# Patient Record
Sex: FEMALE | Race: BLACK OR AFRICAN AMERICAN | NOT HISPANIC OR LATINO | Employment: OTHER | URBAN - METROPOLITAN AREA
[De-identification: names, ages, dates, MRNs, and addresses within clinical notes are randomized per-mention and may not be internally consistent; named-entity substitution may affect disease eponyms.]

---

## 2023-03-11 ENCOUNTER — HOSPITAL ENCOUNTER (EMERGENCY)
Facility: HOSPITAL | Age: 83
Discharge: HOME | End: 2023-03-11
Attending: FAMILY MEDICINE
Payer: MEDICARE

## 2023-03-11 VITALS
TEMPERATURE: 98 F | RESPIRATION RATE: 16 BRPM | HEART RATE: 65 BPM | WEIGHT: 158.31 LBS | OXYGEN SATURATION: 100 % | SYSTOLIC BLOOD PRESSURE: 142 MMHG | DIASTOLIC BLOOD PRESSURE: 64 MMHG
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
## 2023-03-11 PROCEDURE — 99284 EMERGENCY DEPT VISIT MOD MDM: CPT | Mod: 25

## 2023-03-12 NOTE — ED PROVIDER NOTES
SCRIBE #1 NOTE: I, Aida Law, am scribing for, and in the presence of, Yudi Campbell MD. I have scribed the entire note.       History     Chief Complaint   Patient presents with    Fall     Sent from the nursing home after a fall today for evaluation. No LOC, takes ASA daily, Lac. Noted left eye with no bleeding. Pt. Is nonverbal, Hx. Of dementia.      Review of patient's allergies indicates:  No Known Allergies      History of Present Illness     HPI    3/11/2023, 7:13 PM  History obtained from the patient      History of Present Illness: Ansley Grant is a 82 y.o. female patient who presents to the Emergency Department for evaluation of a fall which onset gradually PTA. The Pt was sent from her nursing home and sustained a laceration on the L side of her forehead. Symptoms are constant and moderate in severity. No mitigating or exacerbating factors reported. Associated sxs include wound on R foot. Patient denies any fever, chills, CP, SOB, n/v/d, weakness, LOC, and all other sxs at this time. No prior Tx reported .No further complaints or concerns at this time.       Arrival mode: EMS    PCP: No primary care provider on file.        Past Medical History:  No past medical history on file.    Past Surgical History:  No past surgical history on file.      Family History:  No family history on file.    Social History:  Social History     Tobacco Use    Smoking status: Not on file    Smokeless tobacco: Not on file   Substance and Sexual Activity    Alcohol use: Not on file    Drug use: Not on file    Sexual activity: Not on file        Review of Systems     Review of Systems   Constitutional:  Negative for chills and fever.   HENT:  Negative for sore throat.         (+) laceration on forehead   Respiratory:  Negative for shortness of breath.    Cardiovascular:  Negative for chest pain.   Gastrointestinal:  Negative for diarrhea, nausea and vomiting.   Genitourinary:  Negative for dysuria.    Musculoskeletal:  Negative for back pain.   Skin:  Positive for wound (R foot). Negative for rash.   Neurological:  Negative for weakness.        (-) LOC   Hematological:  Does not bruise/bleed easily.   All other systems reviewed and are negative.     Physical Exam     Initial Vitals   BP Pulse Resp Temp SpO2   03/11/23 1728 03/11/23 1728 03/11/23 1728 03/11/23 1815 03/11/23 1728   (!) 151/60 94 20 98.2 °F (36.8 °C) 95 %      MAP       --                 Physical Exam  Nursing Notes and Vital Signs Reviewed.  Constitutional: Patient is in no apparent distress. Well-developed and well-nourished.  Head: Atraumatic. Normocephalic. 1 cm laceration on the L side of her forehead with no bleeding.   Eyes: PERRL. EOM intact. Conjunctivae are not pale. No scleral icterus.  ENT: Mucous membranes are moist. Oropharynx is clear and symmetric.    Neck: Supple. Full ROM. No lymphadenopathy.  Cardiovascular: Regular rate. Regular rhythm. No murmurs, rubs, or gallops. Distal pulses are 2+ and symmetric.  Pulmonary/Chest: No respiratory distress. Clear to auscultation bilaterally. No wheezing or rales.  Abdominal: Soft and non-distended.  There is no tenderness.  No rebound, guarding, or rigidity. Good bowel sounds.  Genitourinary: No CVA tenderness  Musculoskeletal: Moves all extremities. No obvious deformities. No edema. No calf tenderness. On the R foot under first MTP joint, there is a dry, shallow diabetic ulcer.   Skin: Warm and dry.  Neurological:  Alert, awake, and appropriate.  Normal speech.  No acute focal neurological deficits are appreciated.  Psychiatric: Normal affect. Good eye contact. Appropriate in content.     ED Course   Procedures  ED Vital Signs:  Vitals:    03/11/23 1728 03/11/23 1745 03/11/23 1815 03/11/23 1925   BP: (!) 151/60 (!) 145/67  (!) 142/64   Pulse: 94 67  65   Resp: 20 18  16   Temp:   98.2 °F (36.8 °C)    TempSrc:   Oral    SpO2: 95% 100%  100%   Weight:   71.8 kg (158 lb 4.8 oz)         Abnormal Lab Results:  Labs Reviewed - No data to display     All Lab Results:  No results found for this or any previous visit.      Imaging Results:  Imaging Results              CT Head Without Contrast (Final result)  Result time 03/11/23 19:05:27      Final result by Christy Hernández MD (03/11/23 19:05:27)                   Impression:      No overt acute posttraumatic finding as visualized motion degradation      Electronically signed by: Christy Hernández  Date:    03/11/2023  Time:    19:05               Narrative:    EXAMINATION:  CT HEAD WITHOUT CONTRAST    CLINICAL HISTORY:  Facial trauma, blunt;    TECHNIQUE:  Low dose axial images were obtained through the head.  Coronal and sagittal reformations were also performed. Contrast was not administered.    COMPARISON:  None    FINDINGS:  Automated exposure control technique utilized iterative technique    No acute intracranial hemorrhage or mass effect.  Encephalomalacia supratentorial and infratentorial as well as global atrophy.  Ventricles borderline distended for degree of atrophy.  No displaced calvarial finding.  Coarse dural calcification.                                           The Emergency Provider reviewed the vital signs and test results, which are outlined above.     ED Discussion     7:23 PM: Reassessed pt at this time. Discussed with pt all pertinent ED information and results. Discussed pt dx and plan of tx. Gave pt all f/u and return to the ED instructions. All questions and concerns were addressed at this time. Pt expresses understanding of information and instructions, and is comfortable with plan to discharge. Pt is stable for discharge.    I discussed with patient and/or family/caretaker that evaluation in the ED does not suggest any emergent or life threatening medical conditions requiring immediate intervention beyond what was provided in the ED, and I believe patient is safe for discharge.  Regardless, an unremarkable  evaluation in the ED does not preclude the development or presence of a serious of life threatening condition. As such, patient was instructed to return immediately for any worsening or change in current symptoms.       Medical Decision Making:   Clinical Tests:   Radiological Study: Ordered and Reviewed         ED Medication(s):  Medications - No data to display    There are no discharge medications for this patient.              Scribe Attestation:   Scribe #1: I performed the above scribed service and the documentation accurately describes the services I performed. I attest to the accuracy of the note.     Attending:   Physician Attestation Statement for Scribe #1: I, Yudi Campbell MD, personally performed the services described in this documentation, as scribed by Aida Law, in my presence, and it is both accurate and complete.           Clinical Impression       ICD-10-CM ICD-9-CM   1. Fall, initial encounter  W19.XXXA E888.9       Disposition:   Disposition: Discharged  Condition: Stable       Yudi Campbell MD  03/12/23 0067

## 2023-04-13 ENCOUNTER — HOSPITAL ENCOUNTER (OUTPATIENT)
Facility: HOSPITAL | Age: 83
Discharge: HOME | End: 2023-04-14
Attending: EMERGENCY MEDICINE | Admitting: HOSPITALIST
Payer: MEDICARE

## 2023-04-13 DIAGNOSIS — D64.9 ANEMIA, UNSPECIFIED TYPE: Primary | ICD-10-CM

## 2023-04-13 DIAGNOSIS — R07.9 CHEST PAIN: ICD-10-CM

## 2023-04-13 DIAGNOSIS — I50.9 ACUTE ON CHRONIC CONGESTIVE HEART FAILURE, UNSPECIFIED HEART FAILURE TYPE: ICD-10-CM

## 2023-04-13 DIAGNOSIS — R53.1 WEAKNESS: ICD-10-CM

## 2023-04-13 LAB
ABO + RH BLD: NORMAL
ALBUMIN SERPL BCP-MCNC: 3.3 G/DL (ref 3.5–5.2)
ALP SERPL-CCNC: 55 U/L (ref 55–135)
ALT SERPL W/O P-5'-P-CCNC: 8 U/L (ref 10–44)
ANION GAP SERPL CALC-SCNC: 11 MMOL/L (ref 8–16)
ANISOCYTOSIS BLD QL SMEAR: SLIGHT
AST SERPL-CCNC: 20 U/L (ref 10–40)
BASOPHILS # BLD AUTO: 0.03 K/UL (ref 0–0.2)
BASOPHILS NFR BLD: 0.6 % (ref 0–1.9)
BILIRUB SERPL-MCNC: 0.4 MG/DL (ref 0.1–1)
BLD GP AB SCN CELLS X3 SERPL QL: NORMAL
BLD PROD TYP BPU: NORMAL
BLD PROD TYP BPU: NORMAL
BLOOD UNIT EXPIRATION DATE: NORMAL
BLOOD UNIT EXPIRATION DATE: NORMAL
BLOOD UNIT TYPE CODE: 7300
BLOOD UNIT TYPE CODE: 7300
BLOOD UNIT TYPE: NORMAL
BLOOD UNIT TYPE: NORMAL
BNP SERPL-MCNC: 1546 PG/ML (ref 0–99)
BUN SERPL-MCNC: 18 MG/DL (ref 8–23)
BURR CELLS BLD QL SMEAR: ABNORMAL
CALCIUM SERPL-MCNC: 9.3 MG/DL (ref 8.7–10.5)
CHLORIDE SERPL-SCNC: 111 MMOL/L (ref 95–110)
CO2 SERPL-SCNC: 19 MMOL/L (ref 23–29)
CODING SYSTEM: NORMAL
CODING SYSTEM: NORMAL
CREAT SERPL-MCNC: 1.2 MG/DL (ref 0.5–1.4)
CROSSMATCH INTERPRETATION: NORMAL
CROSSMATCH INTERPRETATION: NORMAL
DIFFERENTIAL METHOD: ABNORMAL
DISPENSE STATUS: NORMAL
DISPENSE STATUS: NORMAL
EOSINOPHIL # BLD AUTO: 0.2 K/UL (ref 0–0.5)
EOSINOPHIL NFR BLD: 4.7 % (ref 0–8)
ERYTHROCYTE [DISTWIDTH] IN BLOOD BY AUTOMATED COUNT: 22.4 % (ref 11.5–14.5)
EST. GFR  (NO RACE VARIABLE): 45 ML/MIN/1.73 M^2
GLUCOSE SERPL-MCNC: 218 MG/DL (ref 70–110)
HCT VFR BLD AUTO: 23.5 % (ref 37–48.5)
HGB BLD-MCNC: 6.4 G/DL (ref 12–16)
HYPOCHROMIA BLD QL SMEAR: ABNORMAL
IMM GRANULOCYTES # BLD AUTO: 0.02 K/UL (ref 0–0.04)
IMM GRANULOCYTES NFR BLD AUTO: 0.4 % (ref 0–0.5)
LYMPHOCYTES # BLD AUTO: 1.9 K/UL (ref 1–4.8)
LYMPHOCYTES NFR BLD: 40.6 % (ref 18–48)
MCH RBC QN AUTO: 19 PG (ref 27–31)
MCHC RBC AUTO-ENTMCNC: 27.2 G/DL (ref 32–36)
MCV RBC AUTO: 70 FL (ref 82–98)
MONOCYTES # BLD AUTO: 0.7 K/UL (ref 0.3–1)
MONOCYTES NFR BLD: 14.4 % (ref 4–15)
NEUTROPHILS # BLD AUTO: 1.8 K/UL (ref 1.8–7.7)
NEUTROPHILS NFR BLD: 39.3 % (ref 38–73)
NRBC BLD-RTO: 0 /100 WBC
NUM UNITS TRANS PACKED RBC: NORMAL
NUM UNITS TRANS PACKED RBC: NORMAL
OB PNL STL: NEGATIVE
OVALOCYTES BLD QL SMEAR: ABNORMAL
PLATELET # BLD AUTO: 312 K/UL (ref 150–450)
PMV BLD AUTO: ABNORMAL FL (ref 9.2–12.9)
POIKILOCYTOSIS BLD QL SMEAR: ABNORMAL
POLYCHROMASIA BLD QL SMEAR: ABNORMAL
POTASSIUM SERPL-SCNC: 4.6 MMOL/L (ref 3.5–5.1)
PROT SERPL-MCNC: 7.7 G/DL (ref 6–8.4)
RBC # BLD AUTO: 3.36 M/UL (ref 4–5.4)
SCHISTOCYTES BLD QL SMEAR: PRESENT
SODIUM SERPL-SCNC: 141 MMOL/L (ref 136–145)
SPECIMEN OUTDATE: NORMAL
TARGETS BLD QL SMEAR: ABNORMAL
WBC # BLD AUTO: 4.65 K/UL (ref 3.9–12.7)

## 2023-04-13 PROCEDURE — 85025 COMPLETE CBC W/AUTO DIFF WBC: CPT | Performed by: EMERGENCY MEDICINE

## 2023-04-13 PROCEDURE — G0378 HOSPITAL OBSERVATION PER HR: HCPCS

## 2023-04-13 PROCEDURE — 93010 ELECTROCARDIOGRAM REPORT: CPT | Mod: ,,, | Performed by: STUDENT IN AN ORGANIZED HEALTH CARE EDUCATION/TRAINING PROGRAM

## 2023-04-13 PROCEDURE — 83880 ASSAY OF NATRIURETIC PEPTIDE: CPT | Performed by: HOSPITALIST

## 2023-04-13 PROCEDURE — 93005 ELECTROCARDIOGRAM TRACING: CPT

## 2023-04-13 PROCEDURE — P9016 RBC LEUKOCYTES REDUCED: HCPCS | Performed by: EMERGENCY MEDICINE

## 2023-04-13 PROCEDURE — 25000242 PHARM REV CODE 250 ALT 637 W/ HCPCS: Performed by: EMERGENCY MEDICINE

## 2023-04-13 PROCEDURE — 86920 COMPATIBILITY TEST SPIN: CPT | Performed by: EMERGENCY MEDICINE

## 2023-04-13 PROCEDURE — 82272 OCCULT BLD FECES 1-3 TESTS: CPT | Performed by: EMERGENCY MEDICINE

## 2023-04-13 PROCEDURE — 96374 THER/PROPH/DIAG INJ IV PUSH: CPT

## 2023-04-13 PROCEDURE — 94761 N-INVAS EAR/PLS OXIMETRY MLT: CPT

## 2023-04-13 PROCEDURE — 94640 AIRWAY INHALATION TREATMENT: CPT

## 2023-04-13 PROCEDURE — 36415 COLL VENOUS BLD VENIPUNCTURE: CPT | Performed by: EMERGENCY MEDICINE

## 2023-04-13 PROCEDURE — 36430 TRANSFUSION BLD/BLD COMPNT: CPT

## 2023-04-13 PROCEDURE — 86900 BLOOD TYPING SEROLOGIC ABO: CPT | Performed by: EMERGENCY MEDICINE

## 2023-04-13 PROCEDURE — 63600175 PHARM REV CODE 636 W HCPCS: Performed by: EMERGENCY MEDICINE

## 2023-04-13 PROCEDURE — 80053 COMPREHEN METABOLIC PANEL: CPT | Performed by: EMERGENCY MEDICINE

## 2023-04-13 PROCEDURE — 99285 EMERGENCY DEPT VISIT HI MDM: CPT | Mod: 25

## 2023-04-13 PROCEDURE — 93010 EKG 12-LEAD: ICD-10-PCS | Mod: ,,, | Performed by: STUDENT IN AN ORGANIZED HEALTH CARE EDUCATION/TRAINING PROGRAM

## 2023-04-13 RX ORDER — MORPHINE SULFATE 4 MG/ML
2 INJECTION, SOLUTION INTRAMUSCULAR; INTRAVENOUS EVERY 4 HOURS PRN
Status: DISCONTINUED | OUTPATIENT
Start: 2023-04-13 | End: 2023-04-14 | Stop reason: HOSPADM

## 2023-04-13 RX ORDER — MAG HYDROX/ALUMINUM HYD/SIMETH 200-200-20
30 SUSPENSION, ORAL (FINAL DOSE FORM) ORAL 4 TIMES DAILY PRN
Status: DISCONTINUED | OUTPATIENT
Start: 2023-04-13 | End: 2023-04-14 | Stop reason: HOSPADM

## 2023-04-13 RX ORDER — HYDROCODONE BITARTRATE AND ACETAMINOPHEN 500; 5 MG/1; MG/1
TABLET ORAL
Status: DISCONTINUED | OUTPATIENT
Start: 2023-04-13 | End: 2023-04-14 | Stop reason: HOSPADM

## 2023-04-13 RX ORDER — ACETAMINOPHEN 650 MG/1
650 SUPPOSITORY RECTAL EVERY 6 HOURS PRN
Status: DISCONTINUED | OUTPATIENT
Start: 2023-04-13 | End: 2023-04-14 | Stop reason: HOSPADM

## 2023-04-13 RX ORDER — AMOXICILLIN 250 MG
1 CAPSULE ORAL 2 TIMES DAILY PRN
Status: DISCONTINUED | OUTPATIENT
Start: 2023-04-13 | End: 2023-04-14 | Stop reason: HOSPADM

## 2023-04-13 RX ORDER — PROMETHAZINE HYDROCHLORIDE 25 MG/1
25 TABLET ORAL EVERY 6 HOURS PRN
Status: DISCONTINUED | OUTPATIENT
Start: 2023-04-13 | End: 2023-04-14 | Stop reason: HOSPADM

## 2023-04-13 RX ORDER — NALOXONE HCL 0.4 MG/ML
0.02 VIAL (ML) INJECTION
Status: DISCONTINUED | OUTPATIENT
Start: 2023-04-13 | End: 2023-04-14 | Stop reason: HOSPADM

## 2023-04-13 RX ORDER — GLUCAGON 1 MG
1 KIT INJECTION
Status: DISCONTINUED | OUTPATIENT
Start: 2023-04-13 | End: 2023-04-14 | Stop reason: HOSPADM

## 2023-04-13 RX ORDER — DEXTROSE 40 %
30 GEL (GRAM) ORAL
Status: DISCONTINUED | OUTPATIENT
Start: 2023-04-13 | End: 2023-04-14 | Stop reason: HOSPADM

## 2023-04-13 RX ORDER — TALC
6 POWDER (GRAM) TOPICAL NIGHTLY PRN
Status: DISCONTINUED | OUTPATIENT
Start: 2023-04-13 | End: 2023-04-14 | Stop reason: HOSPADM

## 2023-04-13 RX ORDER — SODIUM CHLORIDE 0.9 % (FLUSH) 0.9 %
3 SYRINGE (ML) INJECTION EVERY 12 HOURS PRN
Status: DISCONTINUED | OUTPATIENT
Start: 2023-04-13 | End: 2023-04-14 | Stop reason: HOSPADM

## 2023-04-13 RX ORDER — HYDROCODONE BITARTRATE AND ACETAMINOPHEN 5; 325 MG/1; MG/1
1 TABLET ORAL EVERY 6 HOURS PRN
Status: DISCONTINUED | OUTPATIENT
Start: 2023-04-13 | End: 2023-04-14 | Stop reason: HOSPADM

## 2023-04-13 RX ORDER — ACETAMINOPHEN 325 MG/1
650 TABLET ORAL EVERY 8 HOURS PRN
Status: DISCONTINUED | OUTPATIENT
Start: 2023-04-13 | End: 2023-04-14 | Stop reason: HOSPADM

## 2023-04-13 RX ORDER — FUROSEMIDE 10 MG/ML
40 INJECTION INTRAMUSCULAR; INTRAVENOUS
Status: COMPLETED | OUTPATIENT
Start: 2023-04-13 | End: 2023-04-13

## 2023-04-13 RX ORDER — ONDANSETRON 2 MG/ML
4 INJECTION INTRAMUSCULAR; INTRAVENOUS EVERY 8 HOURS PRN
Status: DISCONTINUED | OUTPATIENT
Start: 2023-04-13 | End: 2023-04-14 | Stop reason: HOSPADM

## 2023-04-13 RX ORDER — DEXTROSE 40 %
15 GEL (GRAM) ORAL
Status: DISCONTINUED | OUTPATIENT
Start: 2023-04-13 | End: 2023-04-14 | Stop reason: HOSPADM

## 2023-04-13 RX ORDER — ALBUTEROL SULFATE 0.83 MG/ML
2.5 SOLUTION RESPIRATORY (INHALATION)
Status: COMPLETED | OUTPATIENT
Start: 2023-04-13 | End: 2023-04-13

## 2023-04-13 RX ADMIN — FUROSEMIDE 40 MG: 10 INJECTION, SOLUTION INTRAMUSCULAR; INTRAVENOUS at 08:04

## 2023-04-13 RX ADMIN — ALBUTEROL SULFATE 2.5 MG: 2.5 SOLUTION RESPIRATORY (INHALATION) at 07:04

## 2023-04-13 NOTE — ED PROVIDER NOTES
SCRIBE #1 NOTE: I, Nigel Blackburn, am scribing for, and in the presence of, Angelina Miranda Do, MD. I have scribed the entire note.       History      Chief Complaint   Patient presents with    Anemia     Pt sent for anemia. Lethargic and not answering questions or following commands. Hemoglobin 6.5       Review of patient's allergies indicates:  No Known Allergies     HPI   HPI    4/13/2023, 3:23 PM   [facility]  HPI and ROS limited secondary to pt's dementia      History of Present Illness: Ansley Grant is a 83 y.o. female patient with a PMHx of DM2, HTN, dementia who presents to the Emergency Department for abnormal lab. Pt was sent to the ED from [facility] after labs drawn showed low hemoglobin of 6.5. CBG at the scene was 251 per [facility]. Symptoms are constant and moderate in severity. No mitigating or exacerbating factors reported. Associated sxs include worsening fatigue. No prior Tx reported. No further complaints or concerns at this time.     Arrival mode: [facility]    PCP: Primary Doctor No       Past Medical History:  Past Medical History:   Diagnosis Date    Diabetes mellitus type II     Esophageal reflux     Essential hypertension, benign     Goiter     Hyperparathyroidism, unspecified     Other and unspecified hyperlipidemia        Past Surgical History:  Past Surgical History:   Procedure Laterality Date    INTRACAPSULAR CATARACT EXTRACTION      right eye         Family History:  Family History   Problem Relation Age of Onset    Cataracts Sister        Social History:  Social History     Tobacco Use    Smoking status: Former     Types: Cigarettes    Smokeless tobacco: Never    Tobacco comments:     social smoker when younger   Substance and Sexual Activity    Alcohol use: No    Drug use: No    Sexual activity: Never       ROS   Review of Systems   Unable to perform ROS: Dementia   Constitutional:  Positive for fatigue.     Physical Exam      Initial Vitals   BP Pulse Resp Temp SpO2    04/13/23 1450 04/13/23 1450 04/13/23 1450 04/13/23 1556 04/13/23 1450   (!) 155/65 83 16 97.5 °F (36.4 °C) 100 %      MAP       --                 Physical Exam  Nursing Notes and Vital Signs Reviewed.  Constitutional: Patient is in no acute distress.   Head: Atraumatic.   Eyes: PERRL. EOM intact. Conjunctivae are not pale. No scleral icterus.  ENT: Mucous membranes are moist. Oropharynx is clear and symmetric. Large keloid to R ear.  Neck: Supple. Full ROM.   Cardiovascular: Regular rate. Regular rhythm. No murmurs, rubs, or gallops.   Pulmonary/Chest: No respiratory distress. Clear to auscultation bilaterally. No wheezing or rales.  Abdominal: Soft and non-distended.  There is no tenderness.  No rebound, guarding, or rigidity.  Musculoskeletal: Moves all extremities. No obvious deformities. No edema.  Skin: Warm and dry.  Neurological: Awake and alert. Not interactive, will not answer questions or follow commands.    ED Course    Critical Care    Date/Time: 4/13/2023 4:11 PM  Performed by: Angelina Miranda Do, MD  Authorized by: Angelina Miranda Do, MD   Direct patient critical care time: 15 minutes  Additional history critical care time: 5 minutes  Ordering / reviewing critical care time: 10 minutes  Documentation critical care time: 5 minutes  Total critical care time (exclusive of procedural time) : 35 minutes  Critical care time was exclusive of separately billable procedures and treating other patients and teaching time.  Critical care was necessary to treat or prevent imminent or life-threatening deterioration of the following conditions: Symptomatic anemia.  Critical care was time spent personally by me on the following activities: blood draw for specimens, development of treatment plan with patient or surrogate, interpretation of cardiac output measurements, evaluation of patient's response to treatment, examination of patient, obtaining history from patient or surrogate, ordering and performing treatments  "and interventions, ordering and review of laboratory studies, pulse oximetry, re-evaluation of patient's condition and review of old charts.      ED Vital Signs:  Vitals:    04/13/23 1450 04/13/23 1540 04/13/23 1556 04/13/23 1615   BP: (!) 155/65   (!) 158/65   Pulse: 83 74  75   Resp: 16   19   Temp:   97.5 °F (36.4 °C)    TempSrc:   Oral    SpO2: 100%   100%   Weight:   71.1 kg (156 lb 12 oz)    Height: 5' 9" (1.753 m)       04/13/23 1630 04/13/23 1730 04/13/23 1744 04/13/23 1759   BP: 133/63 (!) 146/67 (!) 142/67 (!) 163/68   Pulse: 78 75 74 75   Resp: 20 (!) 21 20 (!) 23   Temp:  98.2 °F (36.8 °C) 98.2 °F (36.8 °C) 98.8 °F (37.1 °C)   TempSrc:  Axillary Axillary Axillary   SpO2: 99% 100% 100% 100%   Weight:       Height:        04/13/23 1910 04/13/23 1939   BP: (!) 153/67    Pulse: 78 78   Resp: 20 (!) 26   Temp:     TempSrc:     SpO2: 99% 95%   Weight:     Height:         Abnormal Lab Results:  Labs Reviewed   CBC W/ AUTO DIFFERENTIAL - Abnormal; Notable for the following components:       Result Value    RBC 3.36 (*)     Hemoglobin 6.4 (*)     Hematocrit 23.5 (*)     MCV 70 (*)     MCH 19.0 (*)     MCHC 27.2 (*)     RDW 22.4 (*)     All other components within normal limits   COMPREHENSIVE METABOLIC PANEL - Abnormal; Notable for the following components:    Chloride 111 (*)     CO2 19 (*)     Glucose 218 (*)     Albumin 3.3 (*)     ALT 8 (*)     eGFR 45 (*)     All other components within normal limits   OCCULT BLOOD X 1, STOOL   B-TYPE NATRIURETIC PEPTIDE   TYPE & SCREEN   PREPARE RBC SOFT        All Lab Results:  Results for orders placed or performed during the hospital encounter of 04/13/23   CBC auto differential   Result Value Ref Range    WBC 4.65 3.90 - 12.70 K/uL    RBC 3.36 (L) 4.00 - 5.40 M/uL    Hemoglobin 6.4 (L) 12.0 - 16.0 g/dL    Hematocrit 23.5 (L) 37.0 - 48.5 %    MCV 70 (L) 82 - 98 fL    MCH 19.0 (L) 27.0 - 31.0 pg    MCHC 27.2 (L) 32.0 - 36.0 g/dL    RDW 22.4 (H) 11.5 - 14.5 %    Platelets " 312 150 - 450 K/uL    MPV SEE COMMENT 9.2 - 12.9 fL    Immature Granulocytes 0.4 0.0 - 0.5 %    Gran # (ANC) 1.8 1.8 - 7.7 K/uL    Immature Grans (Abs) 0.02 0.00 - 0.04 K/uL    Lymph # 1.9 1.0 - 4.8 K/uL    Mono # 0.7 0.3 - 1.0 K/uL    Eos # 0.2 0.0 - 0.5 K/uL    Baso # 0.03 0.00 - 0.20 K/uL    nRBC 0 0 /100 WBC    Gran % 39.3 38.0 - 73.0 %    Lymph % 40.6 18.0 - 48.0 %    Mono % 14.4 4.0 - 15.0 %    Eosinophil % 4.7 0.0 - 8.0 %    Basophil % 0.6 0.0 - 1.9 %    Aniso Slight     Poik Moderate     Poly Occasional     Hypo Occasional     Ovalocytes Occasional     Target Cells Occasional     Excelsior Cells Occasional     Schistocytes Present     Differential Method Automated    Comprehensive metabolic panel   Result Value Ref Range    Sodium 141 136 - 145 mmol/L    Potassium 4.6 3.5 - 5.1 mmol/L    Chloride 111 (H) 95 - 110 mmol/L    CO2 19 (L) 23 - 29 mmol/L    Glucose 218 (H) 70 - 110 mg/dL    BUN 18 8 - 23 mg/dL    Creatinine 1.2 0.5 - 1.4 mg/dL    Calcium 9.3 8.7 - 10.5 mg/dL    Total Protein 7.7 6.0 - 8.4 g/dL    Albumin 3.3 (L) 3.5 - 5.2 g/dL    Total Bilirubin 0.4 0.1 - 1.0 mg/dL    Alkaline Phosphatase 55 55 - 135 U/L    AST 20 10 - 40 U/L    ALT 8 (L) 10 - 44 U/L    Anion Gap 11 8 - 16 mmol/L    eGFR 45 (A) >60 mL/min/1.73 m^2   Occult blood x 1, stool    Specimen: Stool   Result Value Ref Range    Occult Blood Negative Negative   Type & Screen   Result Value Ref Range    Group & Rh B POS     Indirect Jonathon NEG     Specimen Outdate 04/16/2023 23:59    Prepare RBC 2 Units; Anemia   Result Value Ref Range    UNIT NUMBER [id number]     Product Code [id number]     DISPENSE STATUS ISSUED     CODING SYSTEM [id number]     Unit Blood Type Code 7300     Unit Blood Type B POS     Unit Expiration [id number]     CROSSMATCH INTERPRETATION Compatible     UNIT NUMBER [id number]     Product Code [id number]     DISPENSE STATUS CROSSMATCHED     CODING SYSTEM [id number]     Unit Blood Type Code 7300     Unit Blood Type B POS     Unit  Expiration [id number]     CROSSMATCH INTERPRETATION Compatible      Imaging Results:  Imaging Results              X-Ray Chest AP Portable (Final result)  Result time 04/13/23 19:49:11      Final result by Isaías Cole MD (04/13/23 19:49:11)                   Impression:      As above      Electronically signed by: Isaías Cole  Date:    04/13/2023  Time:    19:49               Narrative:    EXAMINATION:  XR CHEST AP PORTABLE    CLINICAL HISTORY:  Dyspnea is;    TECHNIQUE:  Single frontal view of the chest was performed.    COMPARISON:  None    FINDINGS:  Cardiomegaly small is.  Median sternotomy.  Mild perihilar edema.  Correlate clinically to CHF.    Bones are intact.                                     The EKG was ordered, reviewed, and independently interpreted by the ED provider.  Interpretation time: 15:41  Rate: 76 BPM  Rhythm: normal sinus rhythm  Interpretation: LVH. Inferior infarct, age undetermined. Possible anterior infarct, age undetermined. ST & T wave abnormality, consider lateral ischemia. No STEMI.           The Emergency Provider reviewed the vital signs and test results, which are outlined above.    ED Discussion     7:22 PM: Re-evaluated pt. Family is at bedside. Pt has wheezing at this time, will order breathing treatment and order X-Ray. D/w family all pertinent results. D/w pt's family any concerns expressed at this time. Answered all questions. Family expresses understanding at this time.    8:13 PM: Discussed case with Dr. Hartmann ([facility]). Dr. Hartmann agrees with current care and management of pt and accepts admission.   Admitting Service: Hospital Medicine  Admitting Physician: Dr. Hartmann  Admit to: [facility]    8:13 PM: Re-evaluated pt. I have discussed test results, shared treatment plan, and the need for admission with patient and family at bedside. Pt and family express understanding at this time and agree with all information. All questions answered. Pt and family have  no further questions or concerns at this time. Pt is ready for admit.           ED Medication(s):  Medications   0.9%  NaCl infusion (for blood administration) (has no administration in time range)   furosemide injection 40 mg (has no administration in time range)   albuterol nebulizer solution 2.5 mg (2.5 mg Nebulization Given 4/13/23 1939)       New Prescriptions    No medications on file       Medical Decision Making    Medical Decision Making:   Initial Assessment:   Pt with dementia and anemia  Differential Diagnosis:   Gi bleed, avm, hemorrhoid.  Clinical Tests:   Lab Tests: Ordered and Reviewed  Radiological Study: Ordered and Reviewed  Medical Tests: Ordered and Reviewed  ED Management:  Pt with anemia. Daughters do not want aggressive intervention due to age and severe dementia.  Will admit for blood transfusion. Pt with chf and wheezing before blood transrusion.  Xrya with mild pulmonary edema. Will give lasix with blood transfusion.  Hemoglobin 6.5 xray with mild pulm edema, ekg with no stemi     [facility] medicine services will admit pt          Scribe Attestation:   Scribe #1: I performed the above scribed service and the documentation accurately describes the services I performed. I attest to the accuracy of the note.    Attending:   Physician Attestation Statement for Scribe #1: I, Angelina Miranda Do, MD, personally performed the services described in this documentation, as scribed by Nigel Blackburn, in my presence, and it is both accurate and complete.       Scribe Attestation:   Scribe #2: I performed the above scribed service and the documentation accurately describes the services I performed. I attest to the accuracy of the note.    Attending Attestation:           Physician Attestation for Scribe:    Physician Attestation Statement for Scribe #2: I, Angelina Miranda Do, MD, reviewed documentation, as scribed by Ivan Quan in my presence, and it is both accurate and complete. I also acknowledge  and confirm the content of the note done by Scribe #1.        Clinical Impression       ICD-10-CM ICD-9-CM   1. Anemia, unspecified type  D64.9 285.9   2. Weakness  R53.1 780.79   3. Acute on chronic congestive heart failure, unspecified heart failure type  I50.9 428.0       Disposition:   Disposition: Admitted  Condition: Stable       Angelina Miranda Do, MD  04/13/23 2018

## 2023-04-14 VITALS
OXYGEN SATURATION: 98 % | RESPIRATION RATE: 16 BRPM | HEIGHT: 69 IN | BODY MASS INDEX: 22.76 KG/M2 | WEIGHT: 153.69 LBS | TEMPERATURE: 98 F | DIASTOLIC BLOOD PRESSURE: 67 MMHG | SYSTOLIC BLOOD PRESSURE: 154 MMHG | HEART RATE: 78 BPM

## 2023-04-14 PROBLEM — Z51.5 COMFORT MEASURES ONLY STATUS: Status: ACTIVE | Noted: 2023-04-14

## 2023-04-14 PROBLEM — I50.9 CHF (CONGESTIVE HEART FAILURE): Status: ACTIVE | Noted: 2023-04-14

## 2023-04-14 PROBLEM — D64.9 ANEMIA: Status: ACTIVE | Noted: 2023-04-14

## 2023-04-14 PROBLEM — F02.80 ALZHEIMER'S DEMENTIA: Status: ACTIVE | Noted: 2023-04-14

## 2023-04-14 PROBLEM — G30.9 ALZHEIMER'S DEMENTIA: Status: ACTIVE | Noted: 2023-04-14

## 2023-04-14 PROBLEM — Z79.4 TYPE 2 DIABETES MELLITUS, WITH LONG-TERM CURRENT USE OF INSULIN: Status: ACTIVE | Noted: 2023-04-14

## 2023-04-14 PROBLEM — I25.10 CAD (CORONARY ARTERY DISEASE): Status: ACTIVE | Noted: 2023-04-14

## 2023-04-14 PROBLEM — E11.9 TYPE 2 DIABETES MELLITUS, WITH LONG-TERM CURRENT USE OF INSULIN: Status: ACTIVE | Noted: 2023-04-14

## 2023-04-14 LAB
ALBUMIN SERPL BCP-MCNC: 3.3 G/DL (ref 3.5–5.2)
ALP SERPL-CCNC: 52 U/L (ref 55–135)
ALT SERPL W/O P-5'-P-CCNC: 10 U/L (ref 10–44)
ANION GAP SERPL CALC-SCNC: 10 MMOL/L (ref 8–16)
AST SERPL-CCNC: 23 U/L (ref 10–40)
BASOPHILS # BLD AUTO: 0.04 K/UL (ref 0–0.2)
BASOPHILS NFR BLD: 0.7 % (ref 0–1.9)
BILIRUB SERPL-MCNC: 0.7 MG/DL (ref 0.1–1)
BUN SERPL-MCNC: 18 MG/DL (ref 8–23)
CALCIUM SERPL-MCNC: 9.4 MG/DL (ref 8.7–10.5)
CHLORIDE SERPL-SCNC: 105 MMOL/L (ref 95–110)
CO2 SERPL-SCNC: 27 MMOL/L (ref 23–29)
CREAT SERPL-MCNC: 1.2 MG/DL (ref 0.5–1.4)
DIFFERENTIAL METHOD: ABNORMAL
EOSINOPHIL # BLD AUTO: 0.2 K/UL (ref 0–0.5)
EOSINOPHIL NFR BLD: 3.2 % (ref 0–8)
ERYTHROCYTE [DISTWIDTH] IN BLOOD BY AUTOMATED COUNT: 21.9 % (ref 11.5–14.5)
EST. GFR  (NO RACE VARIABLE): 45 ML/MIN/1.73 M^2
ESTIMATED AVG GLUCOSE: 120 MG/DL (ref 68–131)
GLUCOSE SERPL-MCNC: 126 MG/DL (ref 70–110)
HBA1C MFR BLD: 5.8 % (ref 4–5.6)
HCT VFR BLD AUTO: 30.3 % (ref 37–48.5)
HGB BLD-MCNC: 9.2 G/DL (ref 12–16)
IMM GRANULOCYTES # BLD AUTO: 0.02 K/UL (ref 0–0.04)
IMM GRANULOCYTES NFR BLD AUTO: 0.4 % (ref 0–0.5)
LYMPHOCYTES # BLD AUTO: 1.6 K/UL (ref 1–4.8)
LYMPHOCYTES NFR BLD: 28.2 % (ref 18–48)
MCH RBC QN AUTO: 21.7 PG (ref 27–31)
MCHC RBC AUTO-ENTMCNC: 30.4 G/DL (ref 32–36)
MCV RBC AUTO: 72 FL (ref 82–98)
MONOCYTES # BLD AUTO: 0.8 K/UL (ref 0.3–1)
MONOCYTES NFR BLD: 13.5 % (ref 4–15)
NEUTROPHILS # BLD AUTO: 3.1 K/UL (ref 1.8–7.7)
NEUTROPHILS NFR BLD: 54 % (ref 38–73)
NRBC BLD-RTO: 0 /100 WBC
PLATELET # BLD AUTO: 289 K/UL (ref 150–450)
PMV BLD AUTO: ABNORMAL FL (ref 9.2–12.9)
POTASSIUM SERPL-SCNC: 3.7 MMOL/L (ref 3.5–5.1)
PROT SERPL-MCNC: 7.5 G/DL (ref 6–8.4)
RBC # BLD AUTO: 4.24 M/UL (ref 4–5.4)
SODIUM SERPL-SCNC: 142 MMOL/L (ref 136–145)
WBC # BLD AUTO: 5.7 K/UL (ref 3.9–12.7)

## 2023-04-14 PROCEDURE — 94761 N-INVAS EAR/PLS OXIMETRY MLT: CPT

## 2023-04-14 PROCEDURE — 99900035 HC TECH TIME PER 15 MIN (STAT)

## 2023-04-14 PROCEDURE — G0378 HOSPITAL OBSERVATION PER HR: HCPCS

## 2023-04-14 PROCEDURE — 25000003 PHARM REV CODE 250: Performed by: HOSPITALIST

## 2023-04-14 PROCEDURE — 80053 COMPREHEN METABOLIC PANEL: CPT | Performed by: HOSPITALIST

## 2023-04-14 PROCEDURE — 83036 HEMOGLOBIN GLYCOSYLATED A1C: CPT | Performed by: HOSPITALIST

## 2023-04-14 PROCEDURE — 85025 COMPLETE CBC W/AUTO DIFF WBC: CPT | Performed by: HOSPITALIST

## 2023-04-14 RX ORDER — DONEPEZIL HYDROCHLORIDE 5 MG/1
5 TABLET, FILM COATED ORAL NIGHTLY
Status: DISCONTINUED | OUTPATIENT
Start: 2023-04-14 | End: 2023-04-14 | Stop reason: HOSPADM

## 2023-04-14 RX ORDER — CARVEDILOL 12.5 MG/1
12.5 TABLET ORAL 2 TIMES DAILY
Status: DISCONTINUED | OUTPATIENT
Start: 2023-04-14 | End: 2023-04-14 | Stop reason: HOSPADM

## 2023-04-14 RX ORDER — DEXTROSE 40 %
30 GEL (GRAM) ORAL
Status: DISCONTINUED | OUTPATIENT
Start: 2023-04-14 | End: 2023-04-14

## 2023-04-14 RX ORDER — TALC
6 POWDER (GRAM) TOPICAL NIGHTLY PRN
Refills: 0
Start: 2023-04-14

## 2023-04-14 RX ORDER — DEXTROSE 40 %
15 GEL (GRAM) ORAL
Status: DISCONTINUED | OUTPATIENT
Start: 2023-04-14 | End: 2023-04-14

## 2023-04-14 RX ORDER — FERROUS SULFATE 325(65) MG
325 TABLET ORAL
Refills: 0
Start: 2023-04-14

## 2023-04-14 RX ORDER — MIRTAZAPINE 7.5 MG/1
7.5 TABLET, FILM COATED ORAL NIGHTLY PRN
Status: DISCONTINUED | OUTPATIENT
Start: 2023-04-14 | End: 2023-04-14 | Stop reason: HOSPADM

## 2023-04-14 RX ORDER — GLUCAGON 1 MG
1 KIT INJECTION
Status: DISCONTINUED | OUTPATIENT
Start: 2023-04-14 | End: 2023-04-14 | Stop reason: HOSPADM

## 2023-04-14 RX ORDER — INSULIN ASPART 100 [IU]/ML
0-5 INJECTION, SOLUTION INTRAVENOUS; SUBCUTANEOUS
Status: DISCONTINUED | OUTPATIENT
Start: 2023-04-14 | End: 2023-04-14 | Stop reason: HOSPADM

## 2023-04-14 RX ORDER — CARVEDILOL 12.5 MG/1
12.5 TABLET ORAL 2 TIMES DAILY
Qty: 60 TABLET | Refills: 11
Start: 2023-04-14 | End: 2024-04-13

## 2023-04-14 RX ADMIN — CARVEDILOL 12.5 MG: 12.5 TABLET, FILM COATED ORAL at 09:04

## 2023-04-14 NOTE — H&P
[facility]  History & Physical    Patient Name: Ansley Grant  MRN: [id number]  Patient Class: OP- Observation  Admission Date: 4/13/2023  Attending Physician: Rodrigo Hartmann MD   Primary Care Provider: Primary Doctor No         Patient information was obtained from relative(s), past medical records and ER records.     Subjective:     Principal Problem:<principal problem not specified>    Chief Complaint:   Chief Complaint   Patient presents with    Anemia     Pt sent for anemia. Lethargic and not answering questions or following commands. Hemoglobin 6.5        HPI: Ansley Grant is a 83 y.o. female with a PMH  has a past medical history of Arthritis, CHF (congestive heart failure), Diabetes mellitus type II, Encounter for blood transfusion, Esophageal reflux, Essential hypertension, benign, Goiter, Hyperparathyroidism, unspecified, Other and unspecified hyperlipidemia, Stroke, and Transfusion reaction. who presented to the ED from nursing home for further evaluation after outpatient labs showed low hemoglobin measuring 6.5.  Labs also showed blood glucose measuring 251 per EMS.  No reports of hemoptysis, hematemesis, melena, hematochezia, hematuria reported per family at bedside.  Patient has advanced stages of dementia in his nonverbal at baseline.  Unable to follow commands however is able to ambulate via aid of walker but usually stays in bed most of her time.  Patient did not appear to be in any acute distress with no known concerns or complaints per family at bedside.  Patient's daughter who is power-of- confirmed her DNR status is stated she would only like to pursue medical therapies/treatment and avoid any drastic measures.  Family okay with blood transfusion returned back to nursing home as they did not want to pursue further workup from GI standpoint and decline endoscopy and/or colonoscopy.  Patient being admitted to [facility] under  observation for blood transfusion and likely discharge tomorrow morning back to the nursing home.    PCP: Primary Doctor No        Past Medical History:   Diagnosis Date    Arthritis     CHF (congestive heart failure)     Diabetes mellitus type II     Encounter for blood transfusion     Esophageal reflux     Essential hypertension, benign     Goiter     Hyperparathyroidism, unspecified     Other and unspecified hyperlipidemia     Stroke     Transfusion reaction        Past Surgical History:   Procedure Laterality Date    CARDIAC SURGERY      INTRACAPSULAR CATARACT EXTRACTION      right eye       Review of patient's allergies indicates:  No Known Allergies    No current facility-administered medications on file prior to encounter.     Current Outpatient Medications on File Prior to Encounter   Medication Sig    ergocalciferol (ERGOCALCIFEROL) 50,000 unit Cap Take 1 capsule (50,000 Units total) by mouth every 7 days.    glipiZIDE (GLUCOTROL) 10 MG tablet Take 1 tablet (10 mg total) by mouth daily with breakfast.    ibuprofen (ADVIL,MOTRIN) 600 MG tablet Take 600 mg by mouth every 8 (eight) hours as needed.    metformin (GLUCOPHAGE) 500 MG tablet Take 1 tablet (500 mg total) by mouth 2 (two) times daily with meals.    quinapril (ACCUPRIL) 20 MG tablet Take 1 tablet (20 mg total) by mouth once daily.    rosuvastatin (CRESTOR) 20 MG tablet Take 1 tablet (20 mg total) by mouth once daily.     Family History       Problem Relation (Age of Onset)    Cataracts Sister          Tobacco Use    Smoking status: Former     Types: Cigarettes    Smokeless tobacco: Never    Tobacco comments:     social smoker when younger   Substance and Sexual Activity    Alcohol use: No    Drug use: Never    Sexual activity: Not Currently     Birth control/protection: None     Review of Systems   Unable to perform ROS: Dementia   All other systems reviewed and are negative.  Objective:     Vital Signs (Most Recent):  Temp:  97.6 °F (36.4 °C) (04/14/23 0347)  Pulse: 74 (04/14/23 0347)  Resp: 20 (04/14/23 0347)  BP: (!) 158/67 (04/14/23 0347)  SpO2: 98 % (04/14/23 0347)   Vital Signs (24h Range):  Temp:  [97.5 °F (36.4 °C)-98.8 °F (37.1 °C)] 97.6 °F (36.4 °C)  Pulse:  [72-83] 74  Resp:  [16-31] 20  SpO2:  [94 %-100 %] 98 %  BP: (128-163)/(63-98) 158/67     Weight: 69.7 kg (153 lb 10.6 oz)  Body mass index is 22.68 kg/m².    Physical Exam  Vitals reviewed.   Constitutional:       General: She is not in acute distress.     Appearance: Normal appearance. She is normal weight. She is not ill-appearing, toxic-appearing or diaphoretic.   HENT:      Head: Normocephalic and atraumatic.      Right Ear: External ear normal.      Left Ear: External ear normal.      Ears:      Comments: Large keloid noted on right ear lobe     Nose: Nose normal. No congestion or rhinorrhea.      Mouth/Throat:      Mouth: Mucous membranes are moist.      Pharynx: Oropharynx is clear. No oropharyngeal exudate or posterior oropharyngeal erythema.   Eyes:      General: No scleral icterus.     Extraocular Movements: Extraocular movements intact.      Conjunctiva/sclera: Conjunctivae normal.      Pupils: Pupils are equal, round, and reactive to light.      Comments: Pallor noted   Neck:      Vascular: No carotid bruit.   Cardiovascular:      Rate and Rhythm: Normal rate and regular rhythm.      Pulses: Normal pulses.      Heart sounds: Normal heart sounds. No murmur heard.    No friction rub. No gallop.   Pulmonary:      Effort: Pulmonary effort is normal. No respiratory distress.      Breath sounds: Normal breath sounds. No stridor. No wheezing, rhonchi or rales.   Chest:      Chest wall: No tenderness.   Abdominal:      General: Abdomen is flat. Bowel sounds are normal. There is no distension.      Palpations: Abdomen is soft.      Tenderness: There is no abdominal tenderness. There is no right CVA tenderness, left CVA tenderness, guarding or rebound.      Hernia: No  hernia is present.   Musculoskeletal:         General: No swelling, tenderness, deformity or signs of injury.      Cervical back: Normal range of motion and neck supple. No rigidity or tenderness.      Right lower leg: No edema.      Left lower leg: No edema.      Comments: Unable to assess range of motion but was noted to be moving all extremities sporadically at time of bedside assessment   Lymphadenopathy:      Cervical: No cervical adenopathy.   Skin:     General: Skin is warm and dry.      Capillary Refill: Capillary refill takes less than 2 seconds.      Coloration: Skin is not jaundiced or pale.      Findings: No bruising, erythema, lesion or rash.   Neurological:      Mental Status: She is alert. Mental status is at baseline.      Cranial Nerves: No cranial nerve deficit.      Sensory: No sensory deficit.      Motor: No weakness.      Coordination: Coordination normal.      Comments: Unable to conduct full neurological assessment due to advanced stages of dementia.  Patient noted to be at baseline per family at bedside.  Patient awakens and appears alert but is not oriented and does not follow commands.   Psychiatric:         Mood and Affect: Mood normal.         Behavior: Behavior normal.         Thought Content: Thought content normal.         Judgment: Judgment normal.      Comments: Patient nonverbal with known dementia.  At baseline per family at bedside.         CRANIAL NERVES     CN III, IV, VI   Pupils are equal, round, and reactive to light.     Significant Labs: All pertinent labs within the past 24 hours have been reviewed.    Significant Imaging: I have reviewed all pertinent imaging results/findings within the past 24 hours.    LABS:  Recent Results (from the past 24 hour(s))   CBC auto differential    Collection Time: 04/13/23  3:56 PM   Result Value Ref Range    WBC 4.65 3.90 - 12.70 K/uL    RBC 3.36 (L) 4.00 - 5.40 M/uL    Hemoglobin 6.4 (L) 12.0 - 16.0 g/dL    Hematocrit 23.5 (L) 37.0 - 48.5  %    MCV 70 (L) 82 - 98 fL    MCH 19.0 (L) 27.0 - 31.0 pg    MCHC 27.2 (L) 32.0 - 36.0 g/dL    RDW 22.4 (H) 11.5 - 14.5 %    Platelets 312 150 - 450 K/uL    MPV SEE COMMENT 9.2 - 12.9 fL    Immature Granulocytes 0.4 0.0 - 0.5 %    Gran # (ANC) 1.8 1.8 - 7.7 K/uL    Immature Grans (Abs) 0.02 0.00 - 0.04 K/uL    Lymph # 1.9 1.0 - 4.8 K/uL    Mono # 0.7 0.3 - 1.0 K/uL    Eos # 0.2 0.0 - 0.5 K/uL    Baso # 0.03 0.00 - 0.20 K/uL    nRBC 0 0 /100 WBC    Gran % 39.3 38.0 - 73.0 %    Lymph % 40.6 18.0 - 48.0 %    Mono % 14.4 4.0 - 15.0 %    Eosinophil % 4.7 0.0 - 8.0 %    Basophil % 0.6 0.0 - 1.9 %    Aniso Slight     Poik Moderate     Poly Occasional     Hypo Occasional     Ovalocytes Occasional     Target Cells Occasional     [place] Cells Occasional     Schistocytes Present     Differential Method Automated    Comprehensive metabolic panel    Collection Time: 04/13/23  3:56 PM   Result Value Ref Range    Sodium 141 136 - 145 mmol/L    Potassium 4.6 3.5 - 5.1 mmol/L    Chloride 111 (H) 95 - 110 mmol/L    CO2 19 (L) 23 - 29 mmol/L    Glucose 218 (H) 70 - 110 mg/dL    BUN 18 8 - 23 mg/dL    Creatinine 1.2 0.5 - 1.4 mg/dL    Calcium 9.3 8.7 - 10.5 mg/dL    Total Protein 7.7 6.0 - 8.4 g/dL    Albumin 3.3 (L) 3.5 - 5.2 g/dL    Total Bilirubin 0.4 0.1 - 1.0 mg/dL    Alkaline Phosphatase 55 55 - 135 U/L    AST 20 10 - 40 U/L    ALT 8 (L) 10 - 44 U/L    Anion Gap 11 8 - 16 mmol/L    eGFR 45 (A) >60 mL/min/1.73 m^2   Type & Screen    Collection Time: 04/13/23  4:05 PM   Result Value Ref Range    Group & Rh B POS     Indirect Jonathon NEG     Specimen Outdate 04/16/2023 23:59    Prepare RBC 2 Units; Anemia    Collection Time: 04/13/23  4:05 PM   Result Value Ref Range    UNIT NUMBER [id number]     Product Code M1428W15     DISPENSE STATUS TRANSFUSED     CODING SYSTEM BSFV626     Unit Blood Type Code 7300     Unit Blood Type B POS     Unit Expiration [id number]     CROSSMATCH INTERPRETATION Compatible     UNIT NUMBER [id number]      Product Code K2303K40     DISPENSE STATUS TRANSFUSED     CODING SYSTEM QLWA478     Unit Blood Type Code 7300     Unit Blood Type B POS     Unit Expiration [id number]     CROSSMATCH INTERPRETATION Compatible    Occult blood x 1, stool    Collection Time: 04/13/23  7:28 PM    Specimen: Stool   Result Value Ref Range    Occult Blood Negative Negative   Brain natriuretic peptide    Collection Time: 04/13/23  9:27 PM   Result Value Ref Range    BNP 1,546 (H) 0 - 99 pg/mL       RADIOLOGY  X-Ray Chest AP Portable    Result Date: 4/13/2023  EXAMINATION: XR CHEST AP PORTABLE CLINICAL HISTORY: Dyspnea is; TECHNIQUE: Single frontal view of the chest was performed. COMPARISON: None FINDINGS: Cardiomegaly small is.  Median sternotomy.  Mild perihilar edema.  Correlate clinically to CHF. Bones are intact.     As above Electronically signed by: Isaías Cloe Date:    04/13/2023 Time:    19:49      EKG    MICROBIOLOGY    MDM      Assessment/Plan:     Anemia  Patient incidentally found to have evidence of anemia with H/H measuring 6.5/23.1 on outpatient labs.  MCV 70.  Last reported baseline in epic noted to be 12.2/39.4 respectfully back on 07/07/2020.  Patient is on aspirin and Plavix outpatient but no other anticoagulation therapies.  Patient without evidence or acknowledgement of acute blood loss.  Family requested blood transfusion but no aggressive measures or further diagnostic testing.  Patient currently receiving blood transfusion and will likely be discharged back to nursing home in the morning.  Plan:  -hold antiplatelet/anticoagulation therapies in setting of blood transfusion  -f/u post transfusion labs      Essential hypertension, benign  Currently normotensive. BP currently ranging from 130s-160s systolic.  Plan:  -Optimize pain control   -Continue home medications, titrate as needed   -Monitor BP  -Low salt/cardiac diet when not NPO  -IV hydralazine prn for SBP>160 or DBP>90         Type 2 diabetes mellitus,  with long-term current use of insulin  Patient's FSGs are uncontrolled due to hypoglycemia on current medication regimen.  Last A1c reviewed-   Lab Results   Component Value Date    HGBA1C 9.1 (H) 06/01/2020     Most recent fingerstick glucose reviewed- No results for input(s): POCTGLUCOSE in the last 24 hours.  Current correctional scale  Low  titrate as needed anti-hyperglycemic dose as follows-   Antihyperglycemics (From admission, onward)    Start     Stop Route Frequency Ordered    04/14/23 0614  insulin aspart U-100 pen 0-5 Units         -- SubQ Before meals & nightly PRN 04/14/23 0515      Plan:  -SSI  -Hold oral hypoglycemics while patient is in the hospital  -hypoglycemic protocol    CHF (congestive heart failure)  Patient is identified as having documented systolic heart failure that is Chronic. CHF is currently controlled. Latest ECHO performed and demonstrates- No results found for this or any previous visit.  . Continue Beta Blocker and monitor clinical status closely. Monitor on telemetry. Patient is off CHF pathway.  Monitor strict Is&Os and daily weights.  Place on fluid restriction of 1.5 L. Continue to stress to patient importance of self efficacy and  on diet for CHF. Last BNP reviewed- and noted below   Recent Labs   Lab 04/13/23 2127   BNP 1,546*   Patient with history of documented systolic heart failure with unknown EF found to have elevated BNP measuring 1546 with mild edema noted on chest x-ray.  Patient without evidence of acute distress or volume overload in his s/p Lasix in the ED.    CAD (coronary artery disease)  Patient does not appear to be in acute distress with home medications including aspirin and Plavix.  Not currently on statin.  EKG without evidence of STEMI.  Plan:  -telemetry  -hold aspirin/Plavix in setting of blood transfusion  -YOSELIN therapy p.r.n.      Hyperlipemia  Patient is not chronically on statin.will not continue for now. Last Lipid Panel:   Lab Results    Component Value Date    CHOL 144 03/16/2015    HDL 63 03/16/2015    LDLCALC 58.0 (L) 03/16/2015    TRIG 115 03/16/2015    CHOLHDL 43.8 03/16/2015   Plan:  -low fat/low calorie diet      Alzheimer's dementia  Chronic.  Advanced stages.  Currently on Aricept.  Plan:  -delirium/dementia precautions  -continue home medication      Esophageal reflux  Chronic. Stable. Currently asymptomatic. Not currently on PPI/Antacids.  Plan:  -Continue to monitor       VTE Risk Mitigation (From admission, onward)         Ordered     Reason for No Pharmacological VTE Prophylaxis  Once        Question:  Reasons:  Answer:  Physician Provided (leave comment)  Comment:  Receiving blood    04/13/23 2110     IP VTE HIGH RISK PATIENT  Once         04/13/23 2110     Place sequential compression device  Until discontinued         04/13/23 2110              //Core Measures   -DVT proph: SCDs, withholding anticoagulation in setting of blood transfusion    -Code status: DNR    -Surrogate: daughter       Components of this note were documented using a voice recognition system and are subject to errors not corrected at the time the document was proof read. Please contact the author for any clarifications.        On 04/14/2023, patient should be placed in hospital observation services under my care.      Rodrigo Hartmann MD  Department of Hospital Medicine  [facility] - Telemetry ([facility])

## 2023-04-14 NOTE — SUBJECTIVE & OBJECTIVE
Past Medical History:   Diagnosis Date    Arthritis     CHF (congestive heart failure)     Diabetes mellitus type II     Encounter for blood transfusion     Esophageal reflux     Essential hypertension, benign     Goiter     Hyperparathyroidism, unspecified     Other and unspecified hyperlipidemia     Stroke     Transfusion reaction        Past Surgical History:   Procedure Laterality Date    CARDIAC SURGERY      INTRACAPSULAR CATARACT EXTRACTION      right eye       Review of patient's allergies indicates:  No Known Allergies    No current facility-administered medications on file prior to encounter.     Current Outpatient Medications on File Prior to Encounter   Medication Sig    ergocalciferol (ERGOCALCIFEROL) 50,000 unit Cap Take 1 capsule (50,000 Units total) by mouth every 7 days.    glipiZIDE (GLUCOTROL) 10 MG tablet Take 1 tablet (10 mg total) by mouth daily with breakfast.    ibuprofen (ADVIL,MOTRIN) 600 MG tablet Take 600 mg by mouth every 8 (eight) hours as needed.    metformin (GLUCOPHAGE) 500 MG tablet Take 1 tablet (500 mg total) by mouth 2 (two) times daily with meals.    quinapril (ACCUPRIL) 20 MG tablet Take 1 tablet (20 mg total) by mouth once daily.    rosuvastatin (CRESTOR) 20 MG tablet Take 1 tablet (20 mg total) by mouth once daily.     Family History       Problem Relation (Age of Onset)    Cataracts Sister          Tobacco Use    Smoking status: Former     Types: Cigarettes    Smokeless tobacco: Never    Tobacco comments:     social smoker when younger   Substance and Sexual Activity    Alcohol use: No    Drug use: Never    Sexual activity: Not Currently     Birth control/protection: None     Review of Systems   Unable to perform ROS: Dementia   All other systems reviewed and are negative.  Objective:     Vital Signs (Most Recent):  Temp: 97.6 °F (36.4 °C) (04/14/23 0347)  Pulse: 74 (04/14/23 0347)  Resp: 20 (04/14/23 0347)  BP: (!) 158/67 (04/14/23 0347)  SpO2: 98 % (04/14/23 0347)   Vital  Signs (24h Range):  Temp:  [97.5 °F (36.4 °C)-98.8 °F (37.1 °C)] 97.6 °F (36.4 °C)  Pulse:  [72-83] 74  Resp:  [16-31] 20  SpO2:  [94 %-100 %] 98 %  BP: (128-163)/(63-98) 158/67     Weight: 69.7 kg (153 lb 10.6 oz)  Body mass index is 22.68 kg/m².    Physical Exam  Vitals reviewed.   Constitutional:       General: She is not in acute distress.     Appearance: Normal appearance. She is normal weight. She is not ill-appearing, toxic-appearing or diaphoretic.   HENT:      Head: Normocephalic and atraumatic.      Right Ear: External ear normal.      Left Ear: External ear normal.      Ears:      Comments: Large keloid noted on right ear lobe     Nose: Nose normal. No congestion or rhinorrhea.      Mouth/Throat:      Mouth: Mucous membranes are moist.      Pharynx: Oropharynx is clear. No oropharyngeal exudate or posterior oropharyngeal erythema.   Eyes:      General: No scleral icterus.     Extraocular Movements: Extraocular movements intact.      Conjunctiva/sclera: Conjunctivae normal.      Pupils: Pupils are equal, round, and reactive to light.      Comments: Pallor noted   Neck:      Vascular: No carotid bruit.   Cardiovascular:      Rate and Rhythm: Normal rate and regular rhythm.      Pulses: Normal pulses.      Heart sounds: Normal heart sounds. No murmur heard.    No friction rub. No gallop.   Pulmonary:      Effort: Pulmonary effort is normal. No respiratory distress.      Breath sounds: Normal breath sounds. No stridor. No wheezing, rhonchi or rales.   Chest:      Chest wall: No tenderness.   Abdominal:      General: Abdomen is flat. Bowel sounds are normal. There is no distension.      Palpations: Abdomen is soft.      Tenderness: There is no abdominal tenderness. There is no right CVA tenderness, left CVA tenderness, guarding or rebound.      Hernia: No hernia is present.   Musculoskeletal:         General: No swelling, tenderness, deformity or signs of injury.      Cervical back: Normal range of motion and  neck supple. No rigidity or tenderness.      Right lower leg: No edema.      Left lower leg: No edema.      Comments: Unable to assess range of motion but was noted to be moving all extremities sporadically at time of bedside assessment   Lymphadenopathy:      Cervical: No cervical adenopathy.   Skin:     General: Skin is warm and dry.      Capillary Refill: Capillary refill takes less than 2 seconds.      Coloration: Skin is not jaundiced or pale.      Findings: No bruising, erythema, lesion or rash.   Neurological:      Mental Status: She is alert. Mental status is at baseline.      Cranial Nerves: No cranial nerve deficit.      Sensory: No sensory deficit.      Motor: No weakness.      Coordination: Coordination normal.      Comments: Unable to conduct full neurological assessment due to advanced stages of dementia.  Patient noted to be at baseline per family at bedside.  Patient awakens and appears alert but is not oriented and does not follow commands.   Psychiatric:         Mood and Affect: Mood normal.         Behavior: Behavior normal.         Thought Content: Thought content normal.         Judgment: Judgment normal.      Comments: Patient nonverbal with known dementia.  At baseline per family at bedside.         CRANIAL NERVES     CN III, IV, VI   Pupils are equal, round, and reactive to light.     Significant Labs: All pertinent labs within the past 24 hours have been reviewed.    Significant Imaging: I have reviewed all pertinent imaging results/findings within the past 24 hours.    LABS:  Recent Results (from the past 24 hour(s))   CBC auto differential    Collection Time: 04/13/23  3:56 PM   Result Value Ref Range    WBC 4.65 3.90 - 12.70 K/uL    RBC 3.36 (L) 4.00 - 5.40 M/uL    Hemoglobin 6.4 (L) 12.0 - 16.0 g/dL    Hematocrit 23.5 (L) 37.0 - 48.5 %    MCV 70 (L) 82 - 98 fL    MCH 19.0 (L) 27.0 - 31.0 pg    MCHC 27.2 (L) 32.0 - 36.0 g/dL    RDW 22.4 (H) 11.5 - 14.5 %    Platelets 312 150 - 450 K/uL     MPV SEE COMMENT 9.2 - 12.9 fL    Immature Granulocytes 0.4 0.0 - 0.5 %    Gran # (ANC) 1.8 1.8 - 7.7 K/uL    Immature Grans (Abs) 0.02 0.00 - 0.04 K/uL    Lymph # 1.9 1.0 - 4.8 K/uL    Mono # 0.7 0.3 - 1.0 K/uL    Eos # 0.2 0.0 - 0.5 K/uL    Baso # 0.03 0.00 - 0.20 K/uL    nRBC 0 0 /100 WBC    Gran % 39.3 38.0 - 73.0 %    Lymph % 40.6 18.0 - 48.0 %    Mono % 14.4 4.0 - 15.0 %    Eosinophil % 4.7 0.0 - 8.0 %    Basophil % 0.6 0.0 - 1.9 %    Aniso Slight     Poik Moderate     Poly Occasional     Hypo Occasional     Ovalocytes Occasional     Target Cells Occasional     [place] Cells Occasional     Schistocytes Present     Differential Method Automated    Comprehensive metabolic panel    Collection Time: 04/13/23  3:56 PM   Result Value Ref Range    Sodium 141 136 - 145 mmol/L    Potassium 4.6 3.5 - 5.1 mmol/L    Chloride 111 (H) 95 - 110 mmol/L    CO2 19 (L) 23 - 29 mmol/L    Glucose 218 (H) 70 - 110 mg/dL    BUN 18 8 - 23 mg/dL    Creatinine 1.2 0.5 - 1.4 mg/dL    Calcium 9.3 8.7 - 10.5 mg/dL    Total Protein 7.7 6.0 - 8.4 g/dL    Albumin 3.3 (L) 3.5 - 5.2 g/dL    Total Bilirubin 0.4 0.1 - 1.0 mg/dL    Alkaline Phosphatase 55 55 - 135 U/L    AST 20 10 - 40 U/L    ALT 8 (L) 10 - 44 U/L    Anion Gap 11 8 - 16 mmol/L    eGFR 45 (A) >60 mL/min/1.73 m^2   Type & Screen    Collection Time: 04/13/23  4:05 PM   Result Value Ref Range    Group & Rh B POS     Indirect Jonathon NEG     Specimen Outdate 04/16/2023 23:59    Prepare RBC 2 Units; Anemia    Collection Time: 04/13/23  4:05 PM   Result Value Ref Range    UNIT NUMBER [id number]     Product Code B6011H01     DISPENSE STATUS TRANSFUSED     CODING SYSTEM [id number]     Unit Blood Type Code 7300     Unit Blood Type B POS     Unit Expiration [id number]     CROSSMATCH INTERPRETATION Compatible     UNIT NUMBER [id number]     Product Code J3461G47     DISPENSE STATUS TRANSFUSED     CODING SYSTEM [id number]     Unit Blood Type Code 7300     Unit Blood Type B POS     Unit Expiration  [id number]     CROSSMATCH INTERPRETATION Compatible    Occult blood x 1, stool    Collection Time: 04/13/23  7:28 PM    Specimen: Stool   Result Value Ref Range    Occult Blood Negative Negative   Brain natriuretic peptide    Collection Time: 04/13/23  9:27 PM   Result Value Ref Range    BNP 1,546 (H) 0 - 99 pg/mL       RADIOLOGY  X-Ray Chest AP Portable    Result Date: 4/13/2023  EXAMINATION: XR CHEST AP PORTABLE CLINICAL HISTORY: Dyspnea is; TECHNIQUE: Single frontal view of the chest was performed. COMPARISON: None FINDINGS: Cardiomegaly small is.  Median sternotomy.  Mild perihilar edema.  Correlate clinically to CHF. Bones are intact.     As above Electronically signed by: Isaías Cole Date:    04/13/2023 Time:    19:49      EKG    MICROBIOLOGY    MDM

## 2023-04-14 NOTE — NURSING
Pt is alert  but nonverbal; daughter at the bedside. [facility] because she was lethargic, wheezing showing signs of anemia. Pt was brought via ambulance. Er had given her1 unit of blood at 175an/hr , pt had reactions to the rate soPt was then given 2units of blood at 100ml/hr and pt tolerated it fine. Pt was admitted to [facility] for observation. Pt is stable, however has alzheimers so fall precautions are within place

## 2023-04-14 NOTE — PLAN OF CARE
[facility] - Telemetry (Hospital)  Discharge Assessment    Primary Care Provider: Primary Doctor No     Discharge Assessment (most recent)       BRIEF DISCHARGE ASSESSMENT - 04/14/23 1057          Discharge Planning    Assessment Type Discharge Planning Brief Assessment     Resource/Environmental Concerns none     Support Systems Children;Family members;Home care staff     Assistance Needed NA     Current Living Arrangements residential facility     Care Facility Name [facility]     Patient/Family Anticipates Transition to long-term care facility;home with help/services     Patient/Family Anticipated Services at Transition none     DME Needed Upon Discharge  none     Discharge Plan A Return to nursing home                   SW met with patient and daughter(s) at bedside. Pt is a resident at the [facility]. Family interested in hospice info visit once patient returns to nursing home. SW discussed contracted hospice agencies for the [facility]: [facility] and [facility]. Family prefers referral to [facility]; referral sent via [facility]. SW notified liaison of incoming referral and primary contact: Brenda daughter.     SW to assist with coordinating pt's return to NH.

## 2023-04-14 NOTE — ED NOTES
1st unit blood complete. Per dr Veras, lasix is to be given and wait 1 hour for 2nd unit PRBC. Pt developed wheezes during transfusion. Hx CHF

## 2023-04-14 NOTE — PLAN OF CARE
[facility] - Telemetry (Hospital)  Discharge Final Note    Primary Care Provider: Primary Doctor No    Expected Discharge Date: 4/14/2023    Final Discharge Note (most recent)       Final Note - 04/14/23 1342          Final Note    Assessment Type Final Discharge Note     Anticipated Discharge Disposition Long Term [facility] Resources/Appts/Education Provided Post-Acute resouces added to AVS        Post-Acute Status    Post-Acute Authorization Hospice     Hospice Status Set-up Complete/Auth obtained     Discharge Delays None known at this time                   Patient to return to [facility] today. Family signed consents with [facility]. Ambulance transportation arranged by the [facility]; report has been called by bedside RN.     No additional CM needs for discharge.     Important Message from Medicare             Contact Info       No, Primary Doctor   Relationship: PCP - General        Next Steps: Follow up    Instructions: follow up with MD/NP at nursing home within 3-5 days

## 2023-04-14 NOTE — DISCHARGE INSTRUCTIONS
PT IS DNR  Per request of  daughter,  Brenda Fernandez consult hospice and please notify daughter says she can meet with hospice representatives at facility for admission upon returning to nursing home.  Daughters wish comfort measures only.  Social Work has been consulted to arrange informational visit with hospice in daughters at [facility] for consents to be signed

## 2023-04-16 NOTE — HOSPITAL COURSE
Patient is 83-year-old female multiple medical problems including severe debility and dementia who presented from nursing home for low hemoglobin.  Hemoglobin was 6.5.  Patient is nonverbal at baseline and was unable to have complaints.  Daughter who was power of  confirmed DNR status.  Patient was transfused and after further discussion with family they requested hospice admission.  Social work was consulted for hospice informational visit once patient returns to nursing home.  Patient tolerated transfusion well and hemoglobin returned 9.2.  Patient was seen and examined on day of discharge and was stable to return to nursing home.  We discussed discharge medications with family and they wished to discontinue any nonessential medications and keep patient focused on comfort measures.  This was reflected in her discharge med rec.

## 2023-04-16 NOTE — DISCHARGE SUMMARY
[facility]  [facility]  Discharge Summary      Patient Name: Anslye Grant  MRN: [id number]  [facility]: [id number]  Patient Class: OP- Observation  Admission Date: 4/13/2023  Hospital Length of Stay: 0 days  Discharge Date and Time: 4/14/2023  1:46 PM  Attending Physician: Kaci att. providers found   Discharging Provider: Nedra Sanchez MD  Primary Care Provider: Primary Doctor Kaci    Primary Care Team: Networked reference to record PCT     HPI:   Ansley Grant is a 83 y.o. female with a PMH  has a past medical history of Arthritis, CHF (congestive heart failure), Diabetes mellitus type II, Encounter for blood transfusion, Esophageal reflux, Essential hypertension, benign, Goiter, Hyperparathyroidism, unspecified, Other and unspecified hyperlipidemia, Stroke, and Transfusion reaction. who presented to the ED from nursing home for further evaluation after outpatient labs showed low hemoglobin measuring 6.5.  Labs also showed blood glucose measuring 251 per EMS.  No reports of hemoptysis, hematemesis, melena, hematochezia, hematuria reported per family at bedside.  Patient has advanced stages of dementia in his nonverbal at baseline.  Unable to follow commands however is able to ambulate via aid of walker but usually stays in bed most of her time.  Patient did not appear to be in any acute distress with no known concerns or complaints per family at bedside.  Patient's daughter who is power-of- confirmed her DNR status is stated she would only like to pursue medical therapies/treatment and avoid any drastic measures.  Family okay with blood transfusion returned back to nursing home as they did not want to pursue further workup from GI standpoint and decline endoscopy and/or colonoscopy.  Patient being admitted to Hospital Medicine under observation for blood transfusion and likely discharge tomorrow morning back to the nursing home.    PCP: Primary Doctor No        * No surgery found *       Hospital Course:   Patient is 83-year-old female multiple medical problems including severe debility and dementia who presented from nursing home for low hemoglobin.  Hemoglobin was 6.5.  Patient is nonverbal at baseline and was unable to have complaints.  Daughter who was power of  confirmed DNR status.  Patient was transfused and after further discussion with family they requested hospice admission.  Social work was consulted for hospice informational visit once patient returns to nursing home.  Patient tolerated transfusion well and hemoglobin returned 9.2.  Patient was seen and examined on day of discharge and was stable to return to nursing home.  We discussed discharge medications with family and they wished to discontinue any nonessential medications and keep patient focused on comfort measures.  This was reflected in her discharge med rec.       Goals of Care Treatment Preferences:  Code Status: DNR      Consults:   Consults (From admission, onward)        Status Ordering Provider     IP consult to case management  Once        Provider:  (Not yet assigned)    Completed JOSETTE ESCUDERO          Neuro  Alzheimer's dementia  Chronic.  Advanced stages.  Currently on Aricept.  Plan:  -delirium/dementia precautions  -with the advanced degrees of dementia there is no benefit to Aricept and per family's request this was discontinued      Cardiac/Vascular  CHF (congestive heart failure)  Patient is identified as having documented systolic heart failure that is Chronic. CHF is currently controlled. Latest ECHO performed and demonstrates- No results found for this or any previous visit.  . Continue Beta Blocker and monitor clinical status closely. Monitor on telemetry. Patient is off CHF pathway.  Monitor strict Is&Os and daily weights.  Place on fluid restriction of 1.5 L. Continue to stress to patient importance of self efficacy and  on diet for CHF. Last BNP reviewed- and noted below   Recent  Labs   Lab 04/13/23 2127   BNP 1,546*   Patient with history of documented systolic heart failure with unknown EF found to have elevated BNP measuring 1546 with mild edema noted on chest x-ray.  Patient without evidence of acute distress or volume overload in his s/p Lasix in the ED.    Comfort measures only diet as desired with aspiration precautions.    CAD (coronary artery disease)  Patient does not appear to be in acute distress with home medications including aspirin and Plavix.  Not currently on statin.  EKG without evidence of STEMI.  Plan:  -telemetry  -hold aspirin/Plavix in setting of blood transfusion  -YOSELIN therapy p.r.n.    Given patient's anemia will DC aspirin and Plavix      Hyperlipemia  Patient is not chronically on statin.will not continue for now. Last Lipid Panel:   Lab Results   Component Value Date    CHOL 144 03/16/2015    HDL 63 03/16/2015    LDLCALC 58.0 (L) 03/16/2015    TRIG 115 03/16/2015    CHOLHDL 43.8 03/16/2015   Plan:  Comfort measures      Essential hypertension, benign  Currently normotensive. BP currently ranging from 130s-160s systolic.  Plan:  -Optimize pain control   -Continue home medications, titrate as needed   -Monitor BP          Oncology  * Anemia  Patient incidentally found to have evidence of anemia with H/H measuring 6.5/23.1 on outpatient labs.  MCV 70.  Last reported baseline in epic noted to be 12.2/39.4 respectfully back on 07/07/2020.  Patient is on aspirin and Plavix outpatient but no other anticoagulation therapies.  Patient without evidence or acknowledgement of acute blood loss.  Family requested blood transfusion but no aggressive measures or further diagnostic testing.  Patient currently receiving blood transfusion and will likely be discharged back to nursing home in the morning.  Plan:  -hold antiplatelet/anticoagulation therapies in setting of blood transfusion  -f/u post transfusion labs hemoglobin 9.2      Endocrine  Type 2 diabetes mellitus, with  long-term current use of insulin  Patient's FSGs are uncontrolled due to hypoglycemia on current medication regimen.  Last A1c reviewed-   Lab Results   Component Value Date    HGBA1C 5.8 (H) 04/14/2023     Most recent fingerstick glucose reviewed- No results for input(s): POCTGLUCOSE in the last 24 hours.  Current correctional scale  Low  titrate as needed anti-hyperglycemic dose as follows-   Antihyperglycemics (From admission, onward)    None      Plan:  -SSI  -Hold oral hypoglycemics while patient is in the hospital  -hypoglycemic protocol  Oral hypoglycemics DC the patient family's request    GI  Esophageal reflux  Chronic. Stable. Currently asymptomatic. Not currently on PPI/Antacids.  Plan:  -Continue to monitor       Other  Comfort measures only status          Final Active Diagnoses:    Diagnosis Date Noted POA    PRINCIPAL PROBLEM:  Anemia [D64.9] 04/14/2023 Yes    Type 2 diabetes mellitus, with long-term current use of insulin [E11.9, Z79.4] 04/14/2023 Not Applicable    Alzheimer's dementia [G30.9, F02.80] 04/14/2023 Yes    CAD (coronary artery disease) [I25.10] 04/14/2023 Yes    CHF (congestive heart failure) [I50.9] 04/14/2023 Yes    Comfort measures only status [Z51.5] 04/14/2023 Not Applicable    Hyperlipemia [E78.5] 09/10/2014 Yes    Essential hypertension, benign [I10] 06/20/2013 Yes    Esophageal reflux [K21.9] 06/20/2013 Yes      Problems Resolved During this Admission:       Discharged Condition: poor    Disposition: Home or Self Care    Follow Up:   Follow-up Information     Primary Doctor No Follow up.    Why: follow up with MD/NP at nursing home within 3-5 days                     Patient Instructions:      Diet Dysphagia Mechanical Soft   Order Comments: Nutritional supplement with all meals     Activity as tolerated   Order Comments: Comfort measures and fall precautions       Significant Diagnostic Studies: Labs: All labs within the past 24 hours have been reviewed    Pending  Diagnostic Studies:     None         Medications:  Reconciled Home Medications:      Medication List      START taking these medications    carvediloL 12.5 MG tablet  Commonly known as: COREG  Take 1 tablet (12.5 mg total) by mouth 2 (two) times daily.     ferrous sulfate 325 mg (65 mg iron) Tab tablet  Commonly known as: IRON (FERROUS SULFATE)  Take 1 tablet (325 mg total) by mouth every Mon, Wed, Fri.     melatonin 3 mg tablet  Commonly known as: MELATIN  Take 2 tablets (6 mg total) by mouth nightly as needed for Insomnia.        STOP taking these medications    ergocalciferol 50,000 unit Cap  Commonly known as: ERGOCALCIFEROL     glipiZIDE 10 MG tablet  Commonly known as: GLUCOTROL     ibuprofen 600 MG tablet  Commonly known as: ADVIL,MOTRIN     metFORMIN 500 MG tablet  Commonly known as: GLUCOPHAGE     quinapriL 20 MG tablet  Commonly known as: ACCUPRIL     rosuvastatin 20 MG tablet  Commonly known as: CRESTOR            Indwelling Lines/Drains at time of discharge:   Lines/Drains/Airways     None                 Time spent on the discharge of patient: 40 minutes         Nedra Sanchez MD  Department of Hospital Medicine  '[place] - Telemetry ([facility])